# Patient Record
Sex: FEMALE | Race: WHITE | NOT HISPANIC OR LATINO
[De-identification: names, ages, dates, MRNs, and addresses within clinical notes are randomized per-mention and may not be internally consistent; named-entity substitution may affect disease eponyms.]

---

## 2022-10-12 PROBLEM — Z00.00 ENCOUNTER FOR PREVENTIVE HEALTH EXAMINATION: Status: ACTIVE | Noted: 2022-10-12

## 2022-10-25 ENCOUNTER — APPOINTMENT (OUTPATIENT)
Dept: GYNECOLOGIC ONCOLOGY | Facility: CLINIC | Age: 32
End: 2022-10-25

## 2022-10-25 ENCOUNTER — NON-APPOINTMENT (OUTPATIENT)
Age: 32
End: 2022-10-25

## 2022-10-25 VITALS
SYSTOLIC BLOOD PRESSURE: 106 MMHG | HEART RATE: 66 BPM | WEIGHT: 144 LBS | TEMPERATURE: 97.2 F | HEIGHT: 67 IN | OXYGEN SATURATION: 98 % | BODY MASS INDEX: 22.6 KG/M2 | DIASTOLIC BLOOD PRESSURE: 69 MMHG

## 2022-10-25 DIAGNOSIS — Z78.9 OTHER SPECIFIED HEALTH STATUS: ICD-10-CM

## 2022-10-25 DIAGNOSIS — R87.612 LOW GRADE SQUAMOUS INTRAEPITHELIAL LESION ON CYTOLOGIC SMEAR OF CERVIX (LGSIL): ICD-10-CM

## 2022-10-25 PROCEDURE — 99203 OFFICE O/P NEW LOW 30 MIN: CPT | Mod: 25

## 2022-10-25 PROCEDURE — 57454 BX/CURETT OF CERVIX W/SCOPE: CPT

## 2022-10-25 NOTE — PROCEDURE
[Colposcopy] : colposcopy [LGSIL] : low grade squamous intraepithelial lesion [Patient] : the patient [Verbal Consent] : verbal consent was obtained prior to the procedure and is detailed in the patient's record [SCJ Fully Visualized] : the squamocolumnar junction was fully visualized [No Abnormalities] : no abnormalities [Acetowhite ___ o'clock] : ascetowhite changes at the [unfilled] ~Uo'clock position [Biopsies Taken: # ___] : [unfilled] biopsies taken of the cervix [ECC Done] : an Endocervical curettage was performed.  [Biopsy Locations ___ o'clock] : the biopsies were taken at the [unfilled] o'clock position

## 2022-10-25 NOTE — HISTORY OF PRESENT ILLNESS
[FreeTextEntry1] : Problem\par 1) Abnormal pap\par \par Previous Therapy\par 1) LSIL 10/6/22 HPV + (non 16/18) \par \par 33 yo referred by Dr. Sanchez for LSIL pap. Feeling well overall - stopped OCPs recently - having some pain with ovulation and planning a pelvic US next month. Completed gardasil vaccine. \par \par maternal great grandmother breasts cancer\par maternal grandmother - lymphoma and melanoma

## 2022-10-27 ENCOUNTER — RESULT CHARGE (OUTPATIENT)
Age: 32
End: 2022-10-27

## 2022-10-27 LAB
HCG UR QL: NEGATIVE
QUALITY CONTROL: YES

## 2023-10-20 ENCOUNTER — TRANSCRIPTION ENCOUNTER (OUTPATIENT)
Age: 33
End: 2023-10-20